# Patient Record
Sex: FEMALE | Race: WHITE | URBAN - METROPOLITAN AREA
[De-identification: names, ages, dates, MRNs, and addresses within clinical notes are randomized per-mention and may not be internally consistent; named-entity substitution may affect disease eponyms.]

---

## 2019-04-29 ENCOUNTER — OFFICE VISIT (OUTPATIENT)
Dept: URBAN - METROPOLITAN AREA CLINIC 33 | Facility: CLINIC | Age: 64
End: 2019-04-29
Payer: MEDICARE

## 2019-04-29 DIAGNOSIS — H35.3131 NONEXUDATIVE AGE-RELATED MACULAR DEGENERATION, BILATERAL, EARLY DRY STAGE: ICD-10-CM

## 2019-04-29 DIAGNOSIS — H25.13 AGE-RELATED NUCLEAR CATARACT, BILATERAL: ICD-10-CM

## 2019-04-29 PROCEDURE — 99214 OFFICE O/P EST MOD 30 MIN: CPT | Performed by: OPTOMETRIST

## 2019-04-29 ASSESSMENT — INTRAOCULAR PRESSURE
OS: 21
OD: 20

## 2019-04-29 NOTE — IMPRESSION/PLAN
Impression: Type 2 diabetes mellitus w/o complication: I47.6. Plan: Diabetes w/o Complications: No signs of diabetic retinopathy noted. No treatment necessary at this time. Patient was instructed to monitor vision for sudden changes and to call if visual changes noted. Discussed ocular and systemic benefits of blood sugar control. Continue management with PCP. Letter sent to PCP regarding status of ocular health.

## 2020-11-03 ENCOUNTER — OFFICE VISIT (OUTPATIENT)
Dept: URBAN - METROPOLITAN AREA CLINIC 33 | Facility: CLINIC | Age: 65
End: 2020-11-03
Payer: MEDICARE

## 2020-11-03 DIAGNOSIS — E11.9 TYPE 2 DIABETES MELLITUS W/O COMPLICATION: Primary | ICD-10-CM

## 2020-11-03 PROCEDURE — 99214 OFFICE O/P EST MOD 30 MIN: CPT | Performed by: OPTOMETRIST

## 2020-11-03 ASSESSMENT — VISUAL ACUITY
OD: 20/80
OS: 20/50

## 2020-11-03 ASSESSMENT — KERATOMETRY
OD: 47.25
OS: 48.25

## 2020-11-03 ASSESSMENT — INTRAOCULAR PRESSURE
OS: 17
OD: 17

## 2020-11-03 NOTE — IMPRESSION/PLAN
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 Discussed condition with patient. Recommend evaluation with Dr. Yair Epperson.

## 2020-11-03 NOTE — IMPRESSION/PLAN
Impression: Type 2 diabetes mellitus w/o complication: A40.8. Plan: Diabetes w/o Complications: No signs of diabetic retinopathy noted. No treatment necessary at this time. Patient was instructed to monitor vision for sudden changes and to call if visual changes noted. Discussed ocular and systemic benefits of blood sugar control. Continue management with PCP. Letter sent to PCP regarding status of ocular health.

## 2020-11-13 ENCOUNTER — OFFICE VISIT (OUTPATIENT)
Dept: URBAN - METROPOLITAN AREA CLINIC 33 | Facility: CLINIC | Age: 65
End: 2020-11-13
Payer: MEDICARE

## 2020-11-13 DIAGNOSIS — D48.1 NEOPLASM OF UNCERTAIN BEHAVIOR OF CONNECTIVE AND OTHER SOFT TISSUE: Primary | ICD-10-CM

## 2020-11-13 DIAGNOSIS — L98.9 DISORDER OF THE SKIN AND SUBCUTANEOUS TISSUE, UNSPECIFIED: ICD-10-CM

## 2020-11-13 PROCEDURE — 99204 OFFICE O/P NEW MOD 45 MIN: CPT | Performed by: OPHTHALMOLOGY

## 2020-11-13 PROCEDURE — 92285 EXTERNAL OCULAR PHOTOGRAPHY: CPT | Performed by: OPHTHALMOLOGY

## 2020-11-13 ASSESSMENT — INTRAOCULAR PRESSURE
OS: 15
OD: 15

## 2020-11-13 NOTE — IMPRESSION/PLAN
Impression: Neoplasm of uncertain behavior of connective and other soft tissue: D48.1. Photo Interpretation: supports clinical findings and dx documented in chart. Plan: Discussed diagnosis in detail with patient. Discussed treatment options with patient. Recommend biopsy, obtain prior authorization, further conduct after biopsy results. RBA's discussed in detail with patient today, patient understands and agrees and wishes to proceed with procedure. Schedule excision of lesion with biopsy of the Right Upper Eyelid NAD. *Patient is currently being treated during COVID-19 epidemic, which limits our availability to establish proper follow up care. Patient understands these limitations, and is aware that we will continue treatment and follow up based on our availability, as determined by local state and federal guidelines.

## 2020-12-09 ENCOUNTER — PROCEDURE (OUTPATIENT)
Dept: URBAN - METROPOLITAN AREA CLINIC 33 | Facility: CLINIC | Age: 65
End: 2020-12-09
Payer: MEDICARE

## 2020-12-09 PROCEDURE — 67840 REMOVE EYELID LESION: CPT | Performed by: OPHTHALMOLOGY

## 2020-12-09 RX ORDER — ERYTHROMYCIN 5 MG/G
OINTMENT OPHTHALMIC
Qty: 1 | Refills: 0 | Status: ACTIVE
Start: 2020-12-09

## 2021-07-12 ENCOUNTER — OFFICE VISIT (OUTPATIENT)
Dept: URBAN - METROPOLITAN AREA CLINIC 33 | Facility: CLINIC | Age: 66
End: 2021-07-12
Payer: MEDICARE

## 2021-07-12 DIAGNOSIS — H43.812 VITREOUS DETACHMENT OF LT EYE: ICD-10-CM

## 2021-07-12 PROCEDURE — 99212 OFFICE O/P EST SF 10 MIN: CPT | Performed by: OPTOMETRIST

## 2021-07-12 ASSESSMENT — INTRAOCULAR PRESSURE
OS: 9
OD: 12

## 2021-07-12 NOTE — IMPRESSION/PLAN
Impression: Age-related nuclear cataract, bilateral: H25.13. Plan: Cataracts account for the patient's complaints. No treatment currently recommended. The patient will monitor vision changes and contact us with any decrease in vision. Schedule Refraction.

## 2021-07-12 NOTE — IMPRESSION/PLAN
Impression: Vitreous detachment of lt eye: H43.812. Plan: Discussed diagnosis in detail with patient. No treatment is required at this time. Will continue to observe condition and or symptoms. Discussed signs and symptoms of PVD/floaters. Discussed signs and symptoms of retinal detachment. Patient instructed to call if condition gets worse.

## 2021-07-12 NOTE — IMPRESSION/PLAN
Impression: Type 2 diabetes mellitus w/o complication: G73.0. Plan: Diabetes w/o Complications: No signs of diabetic retinopathy noted. No treatment necessary at this time. Patient was instructed to monitor vision for sudden changes and to call if visual changes noted. Discussed ocular and systemic benefits of blood sugar control. Continue management with PCP. Letter sent to PCP regarding status of ocular health.

## 2021-08-02 ENCOUNTER — OFFICE VISIT (OUTPATIENT)
Dept: URBAN - METROPOLITAN AREA CLINIC 33 | Facility: CLINIC | Age: 66
End: 2021-08-02
Payer: MEDICARE

## 2021-08-02 DIAGNOSIS — H52.223 REGULAR ASTIGMATISM, BILATERAL: Primary | ICD-10-CM

## 2021-08-02 PROCEDURE — 92012 INTRM OPH EXAM EST PATIENT: CPT | Performed by: OPTOMETRIST

## 2021-08-02 ASSESSMENT — VISUAL ACUITY
OS: 20/40
OD: 20/70

## 2022-06-29 ENCOUNTER — APPOINTMENT (RX ONLY)
Dept: URBAN - METROPOLITAN AREA CLINIC 168 | Facility: CLINIC | Age: 67
Setting detail: DERMATOLOGY
End: 2022-06-29

## 2022-06-29 DIAGNOSIS — L82.1 OTHER SEBORRHEIC KERATOSIS: ICD-10-CM

## 2022-06-29 DIAGNOSIS — D22 MELANOCYTIC NEVI: ICD-10-CM

## 2022-06-29 DIAGNOSIS — L73.2 HIDRADENITIS SUPPURATIVA: ICD-10-CM

## 2022-06-29 DIAGNOSIS — D18.0 HEMANGIOMA: ICD-10-CM

## 2022-06-29 DIAGNOSIS — Z71.89 OTHER SPECIFIED COUNSELING: ICD-10-CM

## 2022-06-29 DIAGNOSIS — L81.4 OTHER MELANIN HYPERPIGMENTATION: ICD-10-CM

## 2022-06-29 PROBLEM — D22.5 MELANOCYTIC NEVI OF TRUNK: Status: ACTIVE | Noted: 2022-06-29

## 2022-06-29 PROBLEM — D18.01 HEMANGIOMA OF SKIN AND SUBCUTANEOUS TISSUE: Status: ACTIVE | Noted: 2022-06-29

## 2022-06-29 PROCEDURE — ? HUMIRA INITIATION

## 2022-06-29 PROCEDURE — ? SUNSCREEN RECOMMENDATIONS

## 2022-06-29 PROCEDURE — ? ORDER TESTS

## 2022-06-29 PROCEDURE — ? COUNSELING

## 2022-06-29 PROCEDURE — ? EDUCATIONAL RESOURCES PROVIDED

## 2022-06-29 PROCEDURE — 99204 OFFICE O/P NEW MOD 45 MIN: CPT

## 2022-06-29 PROCEDURE — ? ADDITIONAL NOTES

## 2022-06-29 ASSESSMENT — LOCATION DETAILED DESCRIPTION DERM
LOCATION DETAILED: RIGHT CENTRAL MALAR CHEEK
LOCATION DETAILED: LEFT AXILLARY VAULT
LOCATION DETAILED: RIGHT AXILLARY VAULT
LOCATION DETAILED: RIGHT SUPERIOR MEDIAL MIDBACK
LOCATION DETAILED: LEFT LATERAL ABDOMEN
LOCATION DETAILED: RIGHT LATERAL DISTAL PRETIBIAL REGION
LOCATION DETAILED: LEFT PROXIMAL PRETIBIAL REGION
LOCATION DETAILED: RIGHT ANTERIOR DISTAL UPPER ARM
LOCATION DETAILED: RIGHT LATERAL SUPERIOR CHEST
LOCATION DETAILED: LEFT PROXIMAL DORSAL FOREARM
LOCATION DETAILED: INFERIOR THORACIC SPINE
LOCATION DETAILED: LEFT INFERIOR CENTRAL MALAR CHEEK
LOCATION DETAILED: RIGHT MID-UPPER BACK
LOCATION DETAILED: LEFT MEDIAL FOREHEAD
LOCATION DETAILED: LEFT ANTERIOR SHOULDER
LOCATION DETAILED: UPPER STERNUM
LOCATION DETAILED: RIGHT DISTAL DORSAL FOREARM
LOCATION DETAILED: RIGHT LATERAL ABDOMEN

## 2022-06-29 ASSESSMENT — LOCATION ZONE DERM
LOCATION ZONE: AXILLAE
LOCATION ZONE: LEG
LOCATION ZONE: ARM
LOCATION ZONE: FACE
LOCATION ZONE: TRUNK

## 2022-06-29 ASSESSMENT — LOCATION SIMPLE DESCRIPTION DERM
LOCATION SIMPLE: LEFT CHEEK
LOCATION SIMPLE: LEFT FOREARM
LOCATION SIMPLE: LEFT FOREHEAD
LOCATION SIMPLE: RIGHT CHEEK
LOCATION SIMPLE: RIGHT PRETIBIAL REGION
LOCATION SIMPLE: RIGHT AXILLARY VAULT
LOCATION SIMPLE: ABDOMEN
LOCATION SIMPLE: RIGHT FOREARM
LOCATION SIMPLE: LEFT AXILLARY VAULT
LOCATION SIMPLE: CHEST
LOCATION SIMPLE: RIGHT UPPER BACK
LOCATION SIMPLE: LEFT PRETIBIAL REGION
LOCATION SIMPLE: LEFT SHOULDER
LOCATION SIMPLE: RIGHT UPPER ARM
LOCATION SIMPLE: UPPER BACK
LOCATION SIMPLE: RIGHT LOWER BACK

## 2022-06-29 NOTE — PROCEDURE: MIPS QUALITY
Quality 431: Preventive Care And Screening: Unhealthy Alcohol Use - Screening: Patient not identified as an unhealthy alcohol user when screened for unhealthy alcohol use using a systematic screening method
Quality 110: Preventive Care And Screening: Influenza Immunization: Influenza Immunization previously received during influenza season
Quality 226: Preventive Care And Screening: Tobacco Use: Screening And Cessation Intervention: Patient screened for tobacco use and is an ex/non-smoker
Quality 111:Pneumonia Vaccination Status For Older Adults: Pneumococcal vaccine administered on or after patient’s 60th birthday and before the end of the measurement period
Detail Level: Detailed

## 2022-06-29 NOTE — PROCEDURE: ADDITIONAL NOTES
Patient Management Risk Assessment: Moderate
Additional Notes: Tried and failed: doxycycline, minocycline, Rifampin, Clindamycin gel and sulfa antibiotics, surgical intervention, numerous treatments over the past 33 years. \\n\\nDiscussed Humira in detail as above as patient failed so many treatments and has chronic, extensive disease. \\n\\nPatient has reported hx of long-resolved heart failure, since patient has this history will discuss with cardiologist to obtain clearance for Humira. [facility] today [facility]
Render Risk Assessment In Note?: no
Detail Level: Simple

## 2022-06-29 NOTE — HPI: RASH (HIDRADENITIS SUPPURATIVA)
How Severe Is It?: moderate
Is This A New Presentation, Or A Follow-Up?: Rash
Additional History: She notes years of severe disease, unresponsive to antibiotic courses, surgical intervention. \\n\\nShanh has no hx of MS in self or family. She notes many years ago she was dx with CHF but that her current cardiologist Dr. House has told her she has no signs of this any further. She has had recurrent cellulitis associated only with her HS. Denies other Hepatitis, cocci or TB infections, no h/o of cancer. She has colon polyps and has c-scopes q 2-4 years. She is overdue for her mammogram, last done 2 years ago, as it is severely painful due to her HS. \\n\\nShanh underwent ALVA/BSO.

## 2022-06-29 NOTE — PROCEDURE: ORDER TESTS
Expected Date Of Service: 06/29/2022
Lab Facility: [id number]
Bill For Surgical Tray: no
Performing Laboratory: [id number]
Billing Type: Third-Party Bill

## 2022-06-29 NOTE — PROCEDURE: HUMIRA INITIATION
Detail Level: Zone
Is Soriatane Contraindicated?: No
Pregnancy And Lactation Warning Text: This medication is Pregnancy Category B and is considered safe during pregnancy. It is unknown if this medication is excreted in breast milk.
Humira Monitoring Guidelines: A yearly test for tuberculosis is required while taking Humira.
Humira Dosing: 80 mg SC day 0, 40 mg SC day 7, then 40 mg SC every other week
Diagnosis (Required): Hidradenitis Suppurativa

## 2022-09-09 ENCOUNTER — OFFICE VISIT (OUTPATIENT)
Dept: URBAN - METROPOLITAN AREA CLINIC 33 | Facility: CLINIC | Age: 67
End: 2022-09-09
Payer: MEDICARE

## 2022-09-09 DIAGNOSIS — H25.13 AGE-RELATED NUCLEAR CATARACT, BILATERAL: ICD-10-CM

## 2022-09-09 DIAGNOSIS — E11.9 TYPE 2 DIABETES MELLITUS W/O COMPLICATION: Primary | ICD-10-CM

## 2022-09-09 DIAGNOSIS — H35.3134 BILATERAL NONEXUDATIVE AGE-RELATED MACULAR DEGENERATION, ADVANCED ATROPHIC W/ SUBFOVEAL INVOLVEMENT: ICD-10-CM

## 2022-09-09 PROCEDURE — 99214 OFFICE O/P EST MOD 30 MIN: CPT | Performed by: OPTOMETRIST

## 2022-09-09 ASSESSMENT — INTRAOCULAR PRESSURE
OS: 16
OD: 15

## 2022-09-09 ASSESSMENT — VISUAL ACUITY
OS: 20/30
OD: 20/150

## 2022-09-09 NOTE — IMPRESSION/PLAN
Impression: Type 2 diabetes mellitus w/o complication: Q62.8. Plan: Diabetes w/o Complications: No signs of diabetic retinopathy noted. No treatment necessary at this time. Continue management with PCP.

## 2022-09-09 NOTE — IMPRESSION/PLAN
Impression: Bilateral nonexudative age-related macular degeneration, advanced atrophic w/ subfoveal involvement: H35.3134. Plan: Macular degeneration, dry type, appears progressed with decreased vision. Will refer the patient for a retinal specialist consult. Continue AREDS supplement, patient does not smoke. Ordered and reviewed Optos with patient.

## 2022-09-09 NOTE — IMPRESSION/PLAN
Impression: Age-related nuclear cataract, bilateral: H25.13. Plan: Cataracts account for the patient's complaints. No treatment currently recommended. Continue with current glasses at this time.

## 2022-12-09 ENCOUNTER — APPOINTMENT (RX ONLY)
Dept: URBAN - METROPOLITAN AREA CLINIC 168 | Facility: CLINIC | Age: 67
Setting detail: DERMATOLOGY
End: 2022-12-09

## 2022-12-09 DIAGNOSIS — L73.2 HIDRADENITIS SUPPURATIVA: ICD-10-CM | Status: IMPROVED

## 2022-12-09 PROCEDURE — 99214 OFFICE O/P EST MOD 30 MIN: CPT

## 2022-12-09 PROCEDURE — ? HUMIRA MONITORING

## 2022-12-09 PROCEDURE — ? COUNSELING

## 2022-12-09 PROCEDURE — ? ORDER TESTS

## 2022-12-09 PROCEDURE — ? TREATMENT REGIMEN

## 2022-12-09 ASSESSMENT — LOCATION SIMPLE DESCRIPTION DERM
LOCATION SIMPLE: ABDOMEN
LOCATION SIMPLE: LEFT AXILLARY VAULT
LOCATION SIMPLE: RIGHT AXILLARY VAULT

## 2022-12-09 ASSESSMENT — LOCATION DETAILED DESCRIPTION DERM
LOCATION DETAILED: LEFT AXILLARY VAULT
LOCATION DETAILED: RIGHT LATERAL ABDOMEN
LOCATION DETAILED: RIGHT AXILLARY VAULT
LOCATION DETAILED: LEFT LATERAL ABDOMEN

## 2022-12-09 ASSESSMENT — LOCATION ZONE DERM
LOCATION ZONE: AXILLAE
LOCATION ZONE: TRUNK

## 2022-12-09 ASSESSMENT — HURLEY STAGE
IN YOUR EXPERIENCE, AMONG ALL PATIENTS YOU HAVE SEEN WITH THIS CONDITION, HOW SEVERE IS THIS PATIENT'S CONDITION?: HURLEY STAGE II: SINGLE OR MULTIPLE, WIDELY SEPARATED RECURRENT ABSCESSES WITH SINUS TRACT FORMATION AND SCARRING

## 2022-12-09 NOTE — PROCEDURE: ORDER TESTS
Lab Facility: 0
Billing Type: Third-Party Bill
Bill For Surgical Tray: no
Expected Date Of Service: 12/09/2022

## 2022-12-09 NOTE — PROCEDURE: TREATMENT REGIMEN
Detail Level: Zone
Plan: Tried and failed: doxycycline, minocycline, Rifampin, Clindamycin gel and sulfa antibiotics, surgical intervention, numerous treatments over the past 33 years. \\n\\nShe has some intertriginous erythema suggestive of psoriasis today. Will monitor but if we need to change drug we may be able to switch to Trem[place] under psoriasis dx. \\n\\nPatient was approved for Humira in 09/2022, but reports that she has missed four doses due to other medical issues. \\n\\nWill order labs to [facility] and follow up with patient in 3 months

## 2023-03-06 ENCOUNTER — OFFICE VISIT (OUTPATIENT)
Dept: URBAN - METROPOLITAN AREA CLINIC 33 | Facility: CLINIC | Age: 68
End: 2023-03-06
Payer: COMMERCIAL

## 2023-03-06 DIAGNOSIS — H35.3114 NEXDTVE AGE-REL MCLR DEGN, R EYE, ADV ATRPC W SBFVL INVOLV: Primary | ICD-10-CM

## 2023-03-06 DIAGNOSIS — H35.3123 NEXDTVE AGE-REL MCLR DEGN, L EYE, ADV ATRPC W/O SBFVL INVL: ICD-10-CM

## 2023-03-06 PROCEDURE — 92014 COMPRE OPH EXAM EST PT 1/>: CPT | Performed by: OPHTHALMOLOGY

## 2023-03-06 PROCEDURE — 92134 CPTRZ OPH DX IMG PST SGM RTA: CPT | Performed by: OPHTHALMOLOGY

## 2023-03-06 ASSESSMENT — INTRAOCULAR PRESSURE
OD: 20
OS: 22

## 2023-03-06 NOTE — IMPRESSION/PLAN
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 Left. Condition: new prob, no addtl w/u needed. Vision: vision affected. Plan: Discussed diagnosis in detail with patient. Exam OS shows macular changes associated with Dry ARMD. Informed patient that a new drug has just been approved by the FDA for treatment of Dry ARMD (Intravitreal Injection). Based on findings she is a candidate for this new procedure, at this time we are waiting for more information and coding for treatment. We will call her once coding has been established otherwise she can follow-up in 4 mos. OCT OS shows no IRF or SRF - stable and Optos shows GA OS.

## 2023-03-06 NOTE — IMPRESSION/PLAN
Impression: Nexdtve age-rel mclr degn, r eye, adv atrpc w sbfvl involv: K99.7042. Right. Condition: new prob, no addtl w/u needed. Vision: vision affected. Plan: Discussed diagnosis in detail with patient. Exam OD shows macular changes associated with Dry ARMD. Informed patient that a new drug has just been approved by the FDA for treatment of Dry ARMD (Intravitreal Injection). Based on findings she is a candidate for this new procedure, at this time we are waiting for more information and coding for treatment. We will call her once coding has been established otherwise she can follow-up in 4 mos.  OCT OD shows no IRF or SRF - stable and Optos shows GA OD.

## 2023-07-06 ENCOUNTER — OFFICE VISIT (OUTPATIENT)
Dept: URBAN - METROPOLITAN AREA CLINIC 33 | Facility: CLINIC | Age: 68
End: 2023-07-06
Payer: MEDICARE

## 2023-07-06 DIAGNOSIS — H35.3114 NONEXUDATIVE AGE-RELATED MACULAR DEGENERATION, RIGHT EYE, ADVANCED ATROPHIC WITH SUBFOVEAL INVOLVEMENT: Primary | ICD-10-CM

## 2023-07-06 DIAGNOSIS — H35.3123 NEXDTVE AGE-REL MCLR DEGN, L EYE, ADV ATRPC W/O SBFVL INVL: ICD-10-CM

## 2023-07-06 PROCEDURE — 99213 OFFICE O/P EST LOW 20 MIN: CPT | Performed by: OPHTHALMOLOGY

## 2023-07-06 PROCEDURE — 92134 CPTRZ OPH DX IMG PST SGM RTA: CPT | Performed by: OPHTHALMOLOGY

## 2023-07-06 ASSESSMENT — INTRAOCULAR PRESSURE
OD: 16
OS: 15

## 2023-07-06 NOTE — IMPRESSION/PLAN
Impression: Nexdtve age-rel mclr degn, r eye, adv atrpc w sbfvl involv: B75.8253. Right. Condition: stable. Vision: vision affected. Plan: Discussed diagnosis in detail with patient. Exam OD shows severe geographic atrophy. No treatment is needed at this time. We are still waiting for Insurance to get on board on approving the treatment. Recommend observation. Will reassess condition in 4 - 6 mos. OCT OD shows no IRF or SRF - stable and Optos shows GA OD. Patient is upset she is not receiving the CATIE tx with Syfovre. Patient would like to know how she can alert people around her that she can't see. Recommend a white cane and consider a big bottom that states I'm visually impaired Recommend to go to [facility], wrote a Rx for the cane and bottom.

## 2023-07-06 NOTE — IMPRESSION/PLAN
Impression: Nexdtve age-rel mclr degwan, l eye, adv atrpc w/o sbfvl invl: H32.4801. Left. Condition: stable. Vision: vision affected. Plan: Discussed diagnosis in detail with patient. Exam OD shows severe geographic atrophy. No treatment is needed at this time. We are still waiting for Insurance to get on board on approving the treatment. Recommend observation. Will reassess condition in 4 - 6 mos. OCT OS shows no IRF or SRF - stable and Optos shows GA OS. Patient is upset she is not receiving the CATIE tx with Syfovre. Patient would like to know how she can alert people around her that she can't see. Recommend a white cane and consider a big bottom that states I'm visually impaired Recommend to go to [facility], wrote a Rx for the cane and bottom.

## 2024-02-01 ENCOUNTER — OFFICE VISIT (OUTPATIENT)
Dept: URBAN - METROPOLITAN AREA CLINIC 33 | Facility: CLINIC | Age: 69
End: 2024-02-01
Payer: MEDICARE

## 2024-02-01 DIAGNOSIS — H04.123 DRY EYE SYNDROME OF BILATERAL LACRIMAL GLANDS: ICD-10-CM

## 2024-02-01 DIAGNOSIS — H35.3134 NEXDTVE AGE-REL MCLR DEGN, BI, ADV ATRPC WITH SUBFOVEAL INVL: Primary | ICD-10-CM

## 2024-02-01 PROCEDURE — 92134 CPTRZ OPH DX IMG PST SGM RTA: CPT | Performed by: OPHTHALMOLOGY

## 2024-02-01 PROCEDURE — 99213 OFFICE O/P EST LOW 20 MIN: CPT | Performed by: OPHTHALMOLOGY

## 2024-02-01 ASSESSMENT — INTRAOCULAR PRESSURE
OD: 16
OS: 17

## 2025-01-29 ENCOUNTER — OFFICE VISIT (OUTPATIENT)
Dept: URBAN - METROPOLITAN AREA CLINIC 33 | Facility: CLINIC | Age: 70
End: 2025-01-29
Payer: MEDICARE

## 2025-01-29 DIAGNOSIS — H04.123 DRY EYE SYNDROME OF BILATERAL LACRIMAL GLANDS: ICD-10-CM

## 2025-01-29 DIAGNOSIS — H25.13 AGE-RELATED NUCLEAR CATARACT, BILATERAL: ICD-10-CM

## 2025-01-29 DIAGNOSIS — H43.811 VITREOUS DEGENERATION, RIGHT EYE: Primary | ICD-10-CM

## 2025-01-29 DIAGNOSIS — H35.3134 NEXDTVE AGE-REL MCLR DEGN, BI, ADV ATRPC WITH SUBFOVEAL INVL: ICD-10-CM

## 2025-01-29 PROCEDURE — 92014 COMPRE OPH EXAM EST PT 1/>: CPT

## 2025-01-29 ASSESSMENT — INTRAOCULAR PRESSURE
OS: 16
OD: 16